# Patient Record
Sex: FEMALE | Race: WHITE | NOT HISPANIC OR LATINO | Employment: FULL TIME | URBAN - METROPOLITAN AREA
[De-identification: names, ages, dates, MRNs, and addresses within clinical notes are randomized per-mention and may not be internally consistent; named-entity substitution may affect disease eponyms.]

---

## 2021-05-03 ENCOUNTER — ANNUAL EXAM (OUTPATIENT)
Dept: OBGYN CLINIC | Facility: CLINIC | Age: 41
End: 2021-05-03
Payer: COMMERCIAL

## 2021-05-03 VITALS
HEIGHT: 69 IN | WEIGHT: 224.6 LBS | DIASTOLIC BLOOD PRESSURE: 62 MMHG | SYSTOLIC BLOOD PRESSURE: 110 MMHG | BODY MASS INDEX: 33.27 KG/M2

## 2021-05-03 DIAGNOSIS — Z01.419 ENCOUNTER FOR ANNUAL ROUTINE GYNECOLOGICAL EXAMINATION: Primary | ICD-10-CM

## 2021-05-03 DIAGNOSIS — B00.9 HSV INFECTION: ICD-10-CM

## 2021-05-03 DIAGNOSIS — N92.0 MENORRHAGIA WITH REGULAR CYCLE: ICD-10-CM

## 2021-05-03 DIAGNOSIS — Z12.31 BREAST CANCER SCREENING BY MAMMOGRAM: ICD-10-CM

## 2021-05-03 PROCEDURE — S0612 ANNUAL GYNECOLOGICAL EXAMINA: HCPCS | Performed by: OBSTETRICS & GYNECOLOGY

## 2021-05-03 RX ORDER — VALACYCLOVIR HYDROCHLORIDE 500 MG/1
500 TABLET, FILM COATED ORAL 2 TIMES DAILY
Qty: 6 TABLET | Refills: 5 | Status: SHIPPED | OUTPATIENT
Start: 2021-05-03 | End: 2021-05-21

## 2021-05-03 RX ORDER — LEVOTHYROXINE SODIUM 0.05 MG/1
1 TABLET ORAL DAILY
COMMUNITY
End: 2021-05-03

## 2021-05-03 RX ORDER — CETIRIZINE HYDROCHLORIDE 10 MG/1
1 TABLET ORAL EVERY 24 HOURS
COMMUNITY

## 2021-05-03 RX ORDER — TRANEXAMIC ACID 650 1/1
1300 TABLET ORAL 3 TIMES DAILY
Qty: 30 TABLET | Refills: 0 | Status: SHIPPED | OUTPATIENT
Start: 2021-05-03 | End: 2021-05-08

## 2021-05-03 RX ORDER — MOMETASONE FUROATE 100 UG/1
1 AEROSOL RESPIRATORY (INHALATION) EVERY 12 HOURS
COMMUNITY

## 2021-05-03 NOTE — LETTER
May 4, 2021     Janette Pap, [address]    Patient: Marleni Starks   YOB: 1980   Date of Visit: 5/3/2021       Dear Dr Colletta Hew: Thank you for referring Marleni Starks to me for evaluation  Below are my notes for this consultation  If you have questions, please do not hesitate to call me  I look forward to following your patient along with you  Sincerely,        Oscar Pate MD        CC: No Recipients  Oscar Pate MD  5/4/2021  1:50 PM  Incomplete  Annual Wellness Visit  [address], [address], [facility], [address]    ASSESSMENT/PLAN: Marleni Starks is a 35 y o  [id number] who presents for annual gynecologic exam     Encounter for routine gynecologic examination  - Routine well woman exam completed today  - Cervical Cancer Screening: Current ASCCP Guidelines reviewed  Last Pap: 08/20/2018 neg w/ neg HPV  Next Pap Due: next year  - STI screening offered including HIV testing: offered, pt declined  - Contraceptive counseling discussed  Current contraception: bilateral salpingectomy  - Breast Cancer Screening: Last Mammogram: she has not had one as was trying to get pregnant when she turned 34 and has been breast feeding since  - The following were reviewed in today's visit: mammography screening ordered, exercise, healthy diet  Additional problems addressed during this visit:  1  Encounter for annual routine gynecological examination    2  Breast cancer screening by mammogram  Comments:  pt just completed breast feeding  Recom waiting 6 months until mammogram   Orders:  -     Mammo screening bilateral w 3d & cad; Future; Expected date: 05/03/2022    3  HSV infection  Assessment & Plan:  Initial outbreak 2015  Pt reports a couple outbreaks a year which she treats with topical lidocaine  She does not have script for Valtrex  Reviewed HSV infection course with patient    Discussed considering episodic treatment vs daily suppression  She is interested in episodic tx - script provided w/ refills  Encouraged to start at first signs of outbreak  If pt is unsure if HSV outbreak, can always make appt for exam at time  Orders:  -     valACYclovir (VALTREX) 500 mg tablet; Take 1 tablet (500 mg total) by mouth 2 (two) times a day for 18 days   Start within 24 hours of symptoms of onset  4  Menorrhagia with regular cycle  Assessment & Plan:  Reviewed w/ pt that OCPs often lighten periods and now that she is using non-hormonal BCM, she is having periods normal for her  Given they are regular and last less than a week - I don't believe this is pathologic but physiologic  Discussed options to control heavy or irregular menses include OCPs, progesterone IUD, cyclic progesterone or non-hormonal Lysteda  Discussed in detail with patient including pros and cons of each  She wishes to try Lysteda  Script to pharmacy  F/u 3 months  If no improvement will consider pelvic u/s to review for other causes  Orders:  -     Tranexamic Acid 650 MG TABS; Take 2 tablets (1,300 mg total) by mouth 3 (three) times a day for 5 days During heavy menses  Next visit: 3 months - f/u medication; 1 year wellness      CC:  Annual Gynecologic Examination    HPI: Jessenia Landin is a 35 y o  [id number] who presents for annual gynecologic examination  Patient presents for annual visit approximately 15 months after delivery of her last child  She just recently finished breast feeding  She denies breast or urinary issues  She states period returned a couple months ago and while regular are extremely heavy  She was previously on OCPs prior to last delivery  Gyn History  Patient's last menstrual period was 04/19/2021 (approximate)  Menstrual History  Menarche Age: 13 years  Period Pattern: Regular  Menstrual Flow: Heavy  Last Pap: 08/20/2018 was normal    She  reports being sexually active and has had partner(s) who are Male   She reports using the following method of birth control/protection: Female Sterilization  Sexual History  Sexually Transmitted Infection History: HSV  Is Patient in a Monogamous Relationship?: Yes    OB History      Past Medical History:  No date: Celiac disease      Comment:  gluten intolerant  : HSV infection      Comment:  maybe 2 outbreaks a year  No date: Obesity  2018: Papanicolaou smear  No date: Seasonal allergies     Past Surgical History:  2000: CERVICAL BIOPSY  W/ LOOP ELECTRODE EXCISION  2016:  SECTION  2019:  SECTION  2019: SALPINGECTOMY; Bilateral      Comment:  w/     Family History   Problem Relation Age of Onset    Lung cancer Father     Lung cancer Paternal Grandfather     Lung cancer Paternal Aunt     No Known Problems Mother         adopted, unknown family history    Breast cancer Neg Hx     Colon cancer Neg Hx     Ovarian cancer Neg Hx         Social History     Tobacco Use    Smoking status: Never Smoker    Smokeless tobacco: Never Used   Substance Use Topics    Alcohol use: Yes     Frequency: 2-4 times a month     Drinks per session: 1 or 2     Binge frequency: Never     Comment: Socially on weekends (less than 4/week)     Drug use: Never          Current Outpatient Medications:     albuterol (PROVENTIL HFA,VENTOLIN HFA) 90 mcg/act inhaler, Inhale Every 6 hours, Disp: , Rfl:     cetirizine (ZyrTEC Allergy) 10 mg tablet, Take 1 tablet by mouth every 24 hours, Disp: , Rfl:     Mometasone Furoate (Asmanex HFA) 100 MCG/ACT AERO, Inhale 1 puff Every 12 hours, Disp: , Rfl:     Tranexamic Acid 650 MG TABS, Take 2 tablets (1,300 mg total) by mouth 3 (three) times a day for 5 days During heavy menses  , Disp: 30 tablet, Rfl: 0    valACYclovir (VALTREX) 500 mg tablet, Take 1 tablet (500 mg total) by mouth 2 (two) times a day for 18 days    Start within 24 hours of symptoms of onset , Disp: 6 tablet, Rfl: 5    She is allergic to gluten meal - food allergy and penicillin v  ROS negative except as noted in HPI    Objective:  /62 (BP Location: Left arm, Patient Position: Sitting, Cuff Size: Large)   Ht 5' 8 5" (1 74 m)   Wt 102 kg (224 lb 9 6 oz)   LMP 2021 (Approximate)   Breastfeeding No   BMI 33 65 kg/m²      Physical Exam  Constitutional:       Appearance: Normal appearance  Chest:      Breasts:         Right: No mass or tenderness  Left: No mass or tenderness  Abdominal:      Palpations: Abdomen is soft  Tenderness: There is no abdominal tenderness  Genitourinary:     General: Normal vulva  Vagina: No bleeding or lesions  Cervix: Normal       Uterus: Normal  Not tender  Adnexa:         Right: No mass or tenderness  Left: No mass or tenderness  Rectum: No external hemorrhoid  Musculoskeletal: Normal range of motion  Lymphadenopathy:      Upper Body:      Right upper body: No axillary adenopathy  Left upper body: No axillary adenopathy  Neurological:      Mental Status: She is alert and oriented to person, place, and time  Psychiatric:         Mood and Affect: Mood normal          Behavior: Behavior normal            Damon Calderon MD  2021  9:59 AM  Sign when Signing Visit  Annual Wellness Visit  [address]   {[place] Locations:[address]}    ASSESSMENT/PLAN: Madhuri Addison is a 35 y o  [id number] who presents for annual gynecologic exam     Encounter for routine gynecologic examination  - Routine well woman exam completed today  - Cervical Cancer Screening: Current ASCCP Guidelines reviewed  Last Pap: 2018 ***  Next Pap Due: ***  - STI screening offered including HIV testing: {stitestin}  - Contraceptive counseling discussed    Current contraception: {Contraception:57199::"***"}  - Breast Cancer Screening: Last Mammogram Not on file, ***  - The following were reviewed in today's visit: {Gyn counselin}    Additional problems addressed during this visit: 1  Encounter for annual routine gynecological examination    2  Breast cancer screening by mammogram  -     Mammo screening bilateral w 3d & cad; Future; Expected date: 2022    3  HSV infection  -     valACYclovir (VALTREX) 500 mg tablet; Take 1 tablet (500 mg total) by mouth 2 (two) times a day for 18 days   Start within 24 hours of symptoms of onset  4  Menorrhagia with regular cycle  -     Tranexamic Acid 650 MG TABS; Take 2 tablets (1,300 mg total) by mouth 3 (three) times a day for 5 days During heavy menses  Next visit: ***      CC:  Annual Gynecologic Examination    HPI: Madhuri Addison is a 35 y o  [id number] who presents for annual gynecologic examination  HPI    Gyn History  Patient's last menstrual period was 2021 (approximate)  Menstrual History  Menarche Age: 13 years  Period Pattern: Regular  Menstrual Flow: Heavy  Last Pap: 2018 {findings; last pap:59929}    She  reports being sexually active and has had partner(s) who are Male  She reports using the following method of birth control/protection: Female Sterilization    Sexual History  Sexually Transmitted Infection History: HSV  Is Patient in a Monogamous Relationship?: Yes    OB History      Past Medical History:  No date: Celiac disease      Comment:  gluten intolerant  : HSV infection      Comment:  maybe 2 outbreaks a year  No date: Obesity  2018: Papanicolaou smear  No date: Seasonal allergies     Past Surgical History:  : CERVICAL BIOPSY  W/ LOOP ELECTRODE EXCISION  2016:  SECTION  2019:  SECTION  2019: SALPINGECTOMY; Bilateral      Comment:  w/     Family History   Problem Relation Age of Onset    Lung cancer Father     Lung cancer Paternal Grandfather     Lung cancer Paternal Aunt     No Known Problems Mother         adopted, unknown family history    Breast cancer Neg Hx     Colon cancer Neg Hx     Ovarian cancer Neg Hx         Social History     Tobacco Use    Smoking status: Never Smoker    Smokeless tobacco: Never Used   Substance Use Topics    Alcohol use: Yes     Frequency: 2-4 times a month     Drinks per session: 1 or 2     Binge frequency: Never     Comment: Socially on weekends (less than 4/week)     Drug use: Never          Current Outpatient Medications:     albuterol (PROVENTIL HFA,VENTOLIN HFA) 90 mcg/act inhaler, Inhale Every 6 hours, Disp: , Rfl:     cetirizine (ZyrTEC Allergy) 10 mg tablet, Take 1 tablet by mouth every 24 hours, Disp: , Rfl:     Mometasone Furoate (Asmanex HFA) 100 MCG/ACT AERO, Inhale 1 puff Every 12 hours, Disp: , Rfl:     Tranexamic Acid 650 MG TABS, Take 2 tablets (1,300 mg total) by mouth 3 (three) times a day for 5 days During heavy menses  , Disp: 30 tablet, Rfl: 0    valACYclovir (VALTREX) 500 mg tablet, Take 1 tablet (500 mg total) by mouth 2 (two) times a day for 18 days   Start within 24 hours of symptoms of onset , Disp: 6 tablet, Rfl: 5    She is allergic to gluten meal - food allergy and penicillin v      ROS negative except as noted in HPI    Objective:  /62 (BP Location: Left arm, Patient Position: Sitting, Cuff Size: Large)   Ht 5' 8 5" (1 74 m)   Wt 102 kg (224 lb 9 6 oz)   LMP 04/19/2021 (Approximate)   Breastfeeding No   BMI 33 65 kg/m²      Physical Exam

## 2021-05-03 NOTE — LETTER
May 4, 2021     [place], [address]    Patient: Doris Fiore   YOB: 1980   Date of Visit: 5/3/2021       Dear Dr Merton Gitelman: Thank you for referring Doris Fiore to me for evaluation  Below are my notes for this consultation  If you have questions, please do not hesitate to call me  I look forward to following your patient along with you  Sincerely,        Dwight Olivia MD        CC: No Recipients  Dwight Olivia MD  5/4/2021  1:50 PM  Incomplete  Annual Wellness Visit  [address], [address], [facility], [address]    ASSESSMENT/PLAN: Doris Fiore is a 35 y o  [id number] who presents for annual gynecologic exam     Encounter for routine gynecologic examination  - Routine well woman exam completed today  - Cervical Cancer Screening: Current ASCCP Guidelines reviewed  Last Pap: 08/20/2018 neg w/ neg HPV  Next Pap Due: next year  - STI screening offered including HIV testing: offered, pt declined  - Contraceptive counseling discussed  Current contraception: bilateral salpingectomy  - Breast Cancer Screening: Last Mammogram: she has not had one as was trying to get pregnant when she turned 34 and has been breast feeding since  - The following were reviewed in today's visit: mammography screening ordered, exercise, healthy diet  Additional problems addressed during this visit:  1  Encounter for annual routine gynecological examination    2  Breast cancer screening by mammogram  Comments:  pt just completed breast feeding  Recom waiting 6 months until mammogram   Orders:  -     Mammo screening bilateral w 3d & cad; Future; Expected date: 05/03/2022    3  HSV infection  Assessment & Plan:  Initial outbreak 2015  Pt reports a couple outbreaks a year which she treats with topical lidocaine  She does not have script for Valtrex  Reviewed HSV infection course with patient    Discussed considering episodic treatment vs daily suppression  She is interested in episodic tx - script provided w/ refills  Encouraged to start at first signs of outbreak  If pt is unsure if HSV outbreak, can always make appt for exam at time  Orders:  -     valACYclovir (VALTREX) 500 mg tablet; Take 1 tablet (500 mg total) by mouth 2 (two) times a day for 18 days   Start within 24 hours of symptoms of onset  4  Menorrhagia with regular cycle  Assessment & Plan:  Reviewed w/ pt that OCPs often lighten periods and now that she is using non-hormonal BCM, she is having periods normal for her  Given they are regular and last less than a week - I don't believe this is pathologic but physiologic  Discussed options to control heavy or irregular menses include OCPs, progesterone IUD, cyclic progesterone or non-hormonal Lysteda  Discussed in detail with patient including pros and cons of each  She wishes to try Lysteda  Script to pharmacy  F/u 3 months  If no improvement will consider pelvic u/s to review for other causes  Orders:  -     Tranexamic Acid 650 MG TABS; Take 2 tablets (1,300 mg total) by mouth 3 (three) times a day for 5 days During heavy menses  Next visit: 3 months - f/u medication; 1 year wellness      CC:  Annual Gynecologic Examination    HPI: Ana Bautista is a 35 y o  [id number] who presents for annual gynecologic examination  Patient presents for annual visit approximately 15 months after delivery of her last child  She just recently finished breast feeding  She denies breast or urinary issues  She states period returned a couple months ago and while regular are extremely heavy  She was previously on OCPs prior to last delivery  Gyn History  Patient's last menstrual period was 04/19/2021 (approximate)  Menstrual History  Menarche Age: 13 years  Period Pattern: Regular  Menstrual Flow: Heavy  Last Pap: 08/20/2018 was normal    She  reports being sexually active and has had partner(s) who are Male   She reports using the following method of birth control/protection: Female Sterilization  Sexual History  Sexually Transmitted Infection History: HSV  Is Patient in a Monogamous Relationship?: Yes    OB History      Past Medical History:  No date: Celiac disease      Comment:  gluten intolerant  : HSV infection      Comment:  maybe 2 outbreaks a year  No date: Obesity  2018: Papanicolaou smear  No date: Seasonal allergies     Past Surgical History:  2000: CERVICAL BIOPSY  W/ LOOP ELECTRODE EXCISION  2016:  SECTION  2019:  SECTION  2019: SALPINGECTOMY; Bilateral      Comment:  w/     Family History   Problem Relation Age of Onset    Lung cancer Father     Lung cancer Paternal Grandfather     Lung cancer Paternal Aunt     No Known Problems Mother         adopted, unknown family history    Breast cancer Neg Hx     Colon cancer Neg Hx     Ovarian cancer Neg Hx         Social History     Tobacco Use    Smoking status: Never Smoker    Smokeless tobacco: Never Used   Substance Use Topics    Alcohol use: Yes     Frequency: 2-4 times a month     Drinks per session: 1 or 2     Binge frequency: Never     Comment: Socially on weekends (less than 4/week)     Drug use: Never          Current Outpatient Medications:     albuterol (PROVENTIL HFA,VENTOLIN HFA) 90 mcg/act inhaler, Inhale Every 6 hours, Disp: , Rfl:     cetirizine (ZyrTEC Allergy) 10 mg tablet, Take 1 tablet by mouth every 24 hours, Disp: , Rfl:     Mometasone Furoate (Asmanex HFA) 100 MCG/ACT AERO, Inhale 1 puff Every 12 hours, Disp: , Rfl:     Tranexamic Acid 650 MG TABS, Take 2 tablets (1,300 mg total) by mouth 3 (three) times a day for 5 days During heavy menses  , Disp: 30 tablet, Rfl: 0    valACYclovir (VALTREX) 500 mg tablet, Take 1 tablet (500 mg total) by mouth 2 (two) times a day for 18 days    Start within 24 hours of symptoms of onset , Disp: 6 tablet, Rfl: 5    She is allergic to gluten meal - food allergy and penicillin v  ROS negative except as noted in HPI    Objective:  /62 (BP Location: Left arm, Patient Position: Sitting, Cuff Size: Large)   Ht 5' 8 5" (1 74 m)   Wt 102 kg (224 lb 9 6 oz)   LMP 2021 (Approximate)   Breastfeeding No   BMI 33 65 kg/m²      Physical Exam  Constitutional:       Appearance: Normal appearance  Chest:      Breasts:         Right: No mass or tenderness  Left: No mass or tenderness  Abdominal:      Palpations: Abdomen is soft  Tenderness: There is no abdominal tenderness  Genitourinary:     General: Normal vulva  Vagina: No bleeding or lesions  Cervix: Normal       Uterus: Normal  Not tender  Adnexa:         Right: No mass or tenderness  Left: No mass or tenderness  Rectum: No external hemorrhoid  Musculoskeletal: Normal range of motion  Lymphadenopathy:      Upper Body:      Right upper body: No axillary adenopathy  Left upper body: No axillary adenopathy  Neurological:      Mental Status: She is alert and oriented to person, place, and time  Psychiatric:         Mood and Affect: Mood normal          Behavior: Behavior normal            Elizabeth Lilly MD  2021  9:59 AM  Sign when Signing Visit  Annual Wellness Visit  [address]   {[place] Locations:[address]}    ASSESSMENT/PLAN: Lidia Sandoval is a 35 y o  [id number] who presents for annual gynecologic exam     Encounter for routine gynecologic examination  - Routine well woman exam completed today  - Cervical Cancer Screening: Current ASCCP Guidelines reviewed  Last Pap: 2018 ***  Next Pap Due: ***  - STI screening offered including HIV testing: {stitestin}  - Contraceptive counseling discussed    Current contraception: {Contraception:17480::"***"}  - Breast Cancer Screening: Last Mammogram Not on file, ***  - The following were reviewed in today's visit: {Gyn counselin}    Additional problems addressed during this visit: 1  Encounter for annual routine gynecological examination    2  Breast cancer screening by mammogram  -     Mammo screening bilateral w 3d & cad; Future; Expected date: 2022    3  HSV infection  -     valACYclovir (VALTREX) 500 mg tablet; Take 1 tablet (500 mg total) by mouth 2 (two) times a day for 18 days   Start within 24 hours of symptoms of onset  4  Menorrhagia with regular cycle  -     Tranexamic Acid 650 MG TABS; Take 2 tablets (1,300 mg total) by mouth 3 (three) times a day for 5 days During heavy menses  Next visit: ***      CC:  Annual Gynecologic Examination    HPI: Justin Nickerson is a 35 y o  [id number] who presents for annual gynecologic examination  HPI    Gyn History  Patient's last menstrual period was 2021 (approximate)  Menstrual History  Menarche Age: 13 years  Period Pattern: Regular  Menstrual Flow: Heavy  Last Pap: 2018 {findings; last pap:56828}    She  reports being sexually active and has had partner(s) who are Male  She reports using the following method of birth control/protection: Female Sterilization    Sexual History  Sexually Transmitted Infection History: HSV  Is Patient in a Monogamous Relationship?: Yes    OB History      Past Medical History:  No date: Celiac disease      Comment:  gluten intolerant  2015: HSV infection      Comment:  maybe 2 outbreaks a year  No date: Obesity  2018: Papanicolaou smear  No date: Seasonal allergies     Past Surgical History:  : CERVICAL BIOPSY  W/ LOOP ELECTRODE EXCISION  2016:  SECTION  2019:  SECTION  2019: SALPINGECTOMY; Bilateral      Comment:  w/     Family History   Problem Relation Age of Onset    Lung cancer Father     Lung cancer Paternal Grandfather     Lung cancer Paternal Aunt     No Known Problems Mother         adopted, unknown family history    Breast cancer Neg Hx     Colon cancer Neg Hx     Ovarian cancer Neg Hx         Social History     Tobacco Use    Smoking status: Never Smoker    Smokeless tobacco: Never Used   Substance Use Topics    Alcohol use: Yes     Frequency: 2-4 times a month     Drinks per session: 1 or 2     Binge frequency: Never     Comment: Socially on weekends (less than 4/week)     Drug use: Never          Current Outpatient Medications:     albuterol (PROVENTIL HFA,VENTOLIN HFA) 90 mcg/act inhaler, Inhale Every 6 hours, Disp: , Rfl:     cetirizine (ZyrTEC Allergy) 10 mg tablet, Take 1 tablet by mouth every 24 hours, Disp: , Rfl:     Mometasone Furoate (Asmanex HFA) 100 MCG/ACT AERO, Inhale 1 puff Every 12 hours, Disp: , Rfl:     Tranexamic Acid 650 MG TABS, Take 2 tablets (1,300 mg total) by mouth 3 (three) times a day for 5 days During heavy menses  , Disp: 30 tablet, Rfl: 0    valACYclovir (VALTREX) 500 mg tablet, Take 1 tablet (500 mg total) by mouth 2 (two) times a day for 18 days   Start within 24 hours of symptoms of onset , Disp: 6 tablet, Rfl: 5    She is allergic to gluten meal - food allergy and penicillin v      ROS negative except as noted in HPI    Objective:  /62 (BP Location: Left arm, Patient Position: Sitting, Cuff Size: Large)   Ht 5' 8 5" (1 74 m)   Wt 102 kg (224 lb 9 6 oz)   LMP 04/19/2021 (Approximate)   Breastfeeding No   BMI 33 65 kg/m²      Physical Exam

## 2021-05-03 NOTE — PATIENT INSTRUCTIONS
- Maintain healthy weight with BMI ideally between 18-25     - Eat a healthy diet, including multiple servings of vegetables and fruits, as well as lean protein sources  - Get at least 150 minutes of moderate aerobic activity or 75 minutes of vigorous aerobic activity a week, or a combination of moderate and vigorous activity  Greater amounts of exercise will provide an even greater health benefit  - Ensure diet provides 1200mg of Calcium daily (divided) and 800IU of vitamin D, or take supplements to meet this  - Safe sex practices recommended

## 2021-05-03 NOTE — LETTER
May 4, 2021     Nae Cazares, [address]    Patient: Henri Cornejo   YOB: 1980   Date of Visit: 5/3/2021       Dear Dr Curiel Re: Thank you for referring Henri Cornejo to me for evaluation  Below are my notes for this consultation  If you have questions, please do not hesitate to call me  I look forward to following your patient along with you  Sincerely,        Verena Nuñez MD        CC: No Recipients  Verena Nuñez MD  5/4/2021  1:51 PM  Sign when Signing Visit  Annual Wellness Visit  [address], [address], [facility], [address]    ASSESSMENT/PLAN: Henri Cornejo is a 35 y o  [id number] who presents for annual gynecologic exam     Encounter for routine gynecologic examination  - Routine well woman exam completed today  - Cervical Cancer Screening: Current ASCCP Guidelines reviewed  Last Pap: 08/20/2018 neg w/ neg HPV  Next Pap Due: next year  - STI screening offered including HIV testing: offered, pt declined  - Contraceptive counseling discussed  Current contraception: bilateral salpingectomy  - Breast Cancer Screening: Last Mammogram: she has not had one as was trying to get pregnant when she turned 34 and has been breast feeding since  - The following were reviewed in today's visit: mammography screening ordered, exercise, healthy diet  Additional problems addressed during this visit:  1  Encounter for annual routine gynecological examination    2  Breast cancer screening by mammogram  Comments:  pt just completed breast feeding  Recom waiting 6 months until mammogram   Orders:  -     Mammo screening bilateral w 3d & cad; Future; Expected date: 05/03/2022    3  HSV infection  Assessment & Plan:  Initial outbreak 2015  Pt reports a couple outbreaks a year which she treats with topical lidocaine  She does not have script for Valtrex  Reviewed HSV infection course with patient    Discussed considering episodic treatment vs daily suppression  She is interested in episodic tx - script provided w/ refills  Encouraged to start at first signs of outbreak  If pt is unsure if HSV outbreak, can always make appt for exam at time  Orders:  -     valACYclovir (VALTREX) 500 mg tablet; Take 1 tablet (500 mg total) by mouth 2 (two) times a day for 18 days   Start within 24 hours of symptoms of onset  4  Menorrhagia with regular cycle  Assessment & Plan:  Reviewed w/ pt that OCPs often lighten periods and now that she is using non-hormonal BCM, she is having periods normal for her  Given they are regular and last less than a week - I don't believe this is pathologic but physiologic  Discussed options to control heavy or irregular menses include OCPs, progesterone IUD, cyclic progesterone or non-hormonal Lysteda  Discussed in detail with patient including pros and cons of each  She wishes to try Lysteda  Script to pharmacy  F/u 3 months  If no improvement will consider pelvic u/s to review for other causes  Orders:  -     Tranexamic Acid 650 MG TABS; Take 2 tablets (1,300 mg total) by mouth 3 (three) times a day for 5 days During heavy menses  Next visit: 3 months - f/u medication; 1 year wellness      CC:  Annual Gynecologic Examination    HPI: Max Schaumann is a 35 y o  [id number] who presents for annual gynecologic examination  Patient presents for annual visit approximately 15 months after delivery of her last child  She just recently finished breast feeding  She denies breast or urinary issues  She states period returned a couple months ago and while regular are extremely heavy  She was previously on OCPs prior to last delivery  Gyn History  Patient's last menstrual period was 04/19/2021 (approximate)  Menstrual History  Menarche Age: 13 years  Period Pattern: Regular  Menstrual Flow: Heavy  Last Pap: 08/20/2018 was normal    She  reports being sexually active and has had partner(s) who are Male   She reports using the following method of birth control/protection: Female Sterilization  Sexual History  Sexually Transmitted Infection History: HSV  Is Patient in a Monogamous Relationship?: Yes    OB History      Past Medical History:  No date: Celiac disease      Comment:  gluten intolerant  : HSV infection      Comment:  maybe 2 outbreaks a year  No date: Obesity  2018: Papanicolaou smear  No date: Seasonal allergies     Past Surgical History:  : CERVICAL BIOPSY  W/ LOOP ELECTRODE EXCISION  2016:  SECTION  2019:  SECTION  2019: SALPINGECTOMY; Bilateral      Comment:  w/     Family History   Problem Relation Age of Onset    Lung cancer Father     Lung cancer Paternal Grandfather     Lung cancer Paternal Aunt     No Known Problems Mother         adopted, unknown family history    Breast cancer Neg Hx     Colon cancer Neg Hx     Ovarian cancer Neg Hx         Social History     Tobacco Use    Smoking status: Never Smoker    Smokeless tobacco: Never Used   Substance Use Topics    Alcohol use: Yes     Frequency: 2-4 times a month     Drinks per session: 1 or 2     Binge frequency: Never     Comment: Socially on weekends (less than 4/week)     Drug use: Never          Current Outpatient Medications:     albuterol (PROVENTIL HFA,VENTOLIN HFA) 90 mcg/act inhaler, Inhale Every 6 hours, Disp: , Rfl:     cetirizine (ZyrTEC Allergy) 10 mg tablet, Take 1 tablet by mouth every 24 hours, Disp: , Rfl:     Mometasone Furoate (Asmanex HFA) 100 MCG/ACT AERO, Inhale 1 puff Every 12 hours, Disp: , Rfl:     Tranexamic Acid 650 MG TABS, Take 2 tablets (1,300 mg total) by mouth 3 (three) times a day for 5 days During heavy menses  , Disp: 30 tablet, Rfl: 0    valACYclovir (VALTREX) 500 mg tablet, Take 1 tablet (500 mg total) by mouth 2 (two) times a day for 18 days    Start within 24 hours of symptoms of onset , Disp: 6 tablet, Rfl: 5    She is allergic to gluten meal - food allergy and penicillin v  ROS negative except as noted in HPI    Objective:  /62 (BP Location: Left arm, Patient Position: Sitting, Cuff Size: Large)   Ht 5' 8 5" (1 74 m)   Wt 102 kg (224 lb 9 6 oz)   LMP 04/19/2021 (Approximate)   Breastfeeding No   BMI 33 65 kg/m²      Physical Exam  Constitutional:       Appearance: Normal appearance  Chest:      Breasts:         Right: No mass or tenderness  Left: No mass or tenderness  Abdominal:      Palpations: Abdomen is soft  Tenderness: There is no abdominal tenderness  Genitourinary:     General: Normal vulva  Vagina: No bleeding or lesions  Cervix: Normal       Uterus: Normal  Not tender  Adnexa:         Right: No mass or tenderness  Left: No mass or tenderness  Rectum: No external hemorrhoid  Musculoskeletal: Normal range of motion  Lymphadenopathy:      Upper Body:      Right upper body: No axillary adenopathy  Left upper body: No axillary adenopathy  Neurological:      Mental Status: She is alert and oriented to person, place, and time     Psychiatric:         Mood and Affect: Mood normal          Behavior: Behavior normal

## 2021-05-04 NOTE — ASSESSMENT & PLAN NOTE
Reviewed w/ pt that OCPs often lighten periods and now that she is using non-hormonal BCM, she is having periods normal for her  Given they are regular and last less than a week - I don't believe this is pathologic but physiologic  Discussed options to control heavy or irregular menses include OCPs, progesterone IUD, cyclic progesterone or non-hormonal Lysteda  Discussed in detail with patient including pros and cons of each  She wishes to try Lysteda  Script to pharmacy  F/u 3 months  If no improvement will consider pelvic u/s to review for other causes

## 2021-05-04 NOTE — ASSESSMENT & PLAN NOTE
Initial outbreak 2015  Pt reports a couple outbreaks a year which she treats with topical lidocaine  She does not have script for Valtrex  Reviewed HSV infection course with patient  Discussed considering episodic treatment vs daily suppression  She is interested in episodic tx - script provided w/ refills  Encouraged to start at first signs of outbreak  If pt is unsure if HSV outbreak, can always make appt for exam at time

## 2021-05-04 NOTE — PROGRESS NOTES
Annual Wellness Visit  [address], [address], [facility], [address] [facility]    ASSESSMENT/PLAN: Srinivasa Joseph is a 35 y o  [id number] who presents for annual gynecologic exam     Encounter for routine gynecologic examination  - Routine well woman exam completed today  - Cervical Cancer Screening: Current ASCCP Guidelines reviewed  Last Pap: 08/20/2018 neg w/ neg HPV  Next Pap Due: next year  - STI screening offered including HIV testing: offered, pt declined  - Contraceptive counseling discussed  Current contraception: bilateral salpingectomy  - Breast Cancer Screening: Last Mammogram: she has not had one as was trying to get pregnant when she turned 34 and has been breast feeding since  - The following were reviewed in today's visit: mammography screening ordered, exercise, healthy diet  Additional problems addressed during this visit:  1  Encounter for annual routine gynecological examination    2  Breast cancer screening by mammogram  Comments:  pt just completed breast feeding  Recom waiting 6 months until mammogram   Orders:  -     Mammo screening bilateral w 3d & cad; Future; Expected date: 05/03/2022    3  HSV infection  Assessment & Plan:  Initial outbreak 2015  Pt reports a couple outbreaks a year which she treats with topical lidocaine  She does not have script for Valtrex  Reviewed HSV infection course with patient  Discussed considering episodic treatment vs daily suppression  She is interested in episodic tx - script provided w/ refills  Encouraged to start at first signs of outbreak  If pt is unsure if HSV outbreak, can always make appt for exam at time  Orders:  -     valACYclovir (VALTREX) 500 mg tablet; Take 1 tablet (500 mg total) by mouth 2 (two) times a day for 18 days   Start within 24 hours of symptoms of onset      4  Menorrhagia with regular cycle  Assessment & Plan:  Reviewed w/ pt that OCPs often lighten periods and now that she is using non-hormonal BCM, she is having periods normal for her  Given they are regular and last less than a week - I don't believe this is pathologic but physiologic  Discussed options to control heavy or irregular menses include OCPs, progesterone IUD, cyclic progesterone or non-hormonal Lysteda  Discussed in detail with patient including pros and cons of each  She wishes to try Lysteda  Script to pharmacy  F/u 3 months  If no improvement will consider pelvic u/s to review for other causes  Orders:  -     Tranexamic Acid 650 MG TABS; Take 2 tablets (1,300 mg total) by mouth 3 (three) times a day for 5 days During heavy menses  Next visit: 3 months - f/u medication; 1 year wellness      CC:  Annual Gynecologic Examination    HPI: Marco Pelaez is a 35 y o  [id number] who presents for annual gynecologic examination  Patient presents for annual visit approximately 15 months after delivery of her last child  She just recently finished breast feeding  She denies breast or urinary issues  She states period returned a couple months ago and while regular are extremely heavy  She was previously on OCPs prior to last delivery  Gyn History  Patient's last menstrual period was 2021 (approximate)  Menstrual History  Menarche Age: 13 years  Period Pattern: Regular  Menstrual Flow: Heavy  Last Pap: 2018 was normal    She  reports being sexually active and has had partner(s) who are Male  She reports using the following method of birth control/protection: Female Sterilization    Sexual History  Sexually Transmitted Infection History: HSV  Is Patient in a Monogamous Relationship?: Yes    OB History      Past Medical History:  No date: Celiac disease      Comment:  gluten intolerant  2015: HSV infection      Comment:  maybe 2 outbreaks a year  No date: Obesity  2018: Papanicolaou smear  No date: Seasonal allergies     Past Surgical History:  : CERVICAL BIOPSY  W/ LOOP ELECTRODE EXCISION  2016:  SECTION  2019:  SECTION  2019: SALPINGECTOMY; Bilateral      Comment:  w/     Family History   Problem Relation Age of Onset    Lung cancer Father     Lung cancer Paternal Grandfather     Lung cancer Paternal Aunt     No Known Problems Mother         adopted, unknown family history    Breast cancer Neg Hx     Colon cancer Neg Hx     Ovarian cancer Neg Hx         Social History     Tobacco Use    Smoking status: Never Smoker    Smokeless tobacco: Never Used   Substance Use Topics    Alcohol use: Yes     Frequency: 2-4 times a month     Drinks per session: 1 or 2     Binge frequency: Never     Comment: Socially on weekends (less than 4/week)     Drug use: Never          Current Outpatient Medications:     albuterol (PROVENTIL HFA,VENTOLIN HFA) 90 mcg/act inhaler, Inhale Every 6 hours, Disp: , Rfl:     cetirizine (ZyrTEC Allergy) 10 mg tablet, Take 1 tablet by mouth every 24 hours, Disp: , Rfl:     Mometasone Furoate (Asmanex HFA) 100 MCG/ACT AERO, Inhale 1 puff Every 12 hours, Disp: , Rfl:     Tranexamic Acid 650 MG TABS, Take 2 tablets (1,300 mg total) by mouth 3 (three) times a day for 5 days During heavy menses  , Disp: 30 tablet, Rfl: 0    valACYclovir (VALTREX) 500 mg tablet, Take 1 tablet (500 mg total) by mouth 2 (two) times a day for 18 days   Start within 24 hours of symptoms of onset , Disp: 6 tablet, Rfl: 5    She is allergic to gluten meal - food allergy and penicillin v  ROS negative except as noted in HPI    Objective:  /62 (BP Location: Left arm, Patient Position: Sitting, Cuff Size: Large)   Ht 5' 8 5" (1 74 m)   Wt 102 kg (224 lb 9 6 oz)   LMP 2021 (Approximate)   Breastfeeding No   BMI 33 65 kg/m²      Physical Exam  Constitutional:       Appearance: Normal appearance  Chest:      Breasts:         Right: No mass or tenderness  Left: No mass or tenderness  Abdominal:      Palpations: Abdomen is soft  Tenderness:  There is no abdominal tenderness  Genitourinary:     General: Normal vulva  Vagina: No bleeding or lesions  Cervix: Normal       Uterus: Normal  Not tender  Adnexa:         Right: No mass or tenderness  Left: No mass or tenderness  Rectum: No external hemorrhoid  Musculoskeletal: Normal range of motion  Lymphadenopathy:      Upper Body:      Right upper body: No axillary adenopathy  Left upper body: No axillary adenopathy  Neurological:      Mental Status: She is alert and oriented to person, place, and time     Psychiatric:         Mood and Affect: Mood normal          Behavior: Behavior normal

## 2021-08-19 ENCOUNTER — VBI (OUTPATIENT)
Dept: ADMINISTRATIVE | Facility: OTHER | Age: 41
End: 2021-08-19

## 2021-08-19 NOTE — TELEPHONE ENCOUNTER
Upon review of the In Basket request we were able to locate, review, and update the patient chart as requested for Pap Smear (HPV) aka Cervical Cancer Screening  Any additional questions or concerns should be emailed to the Practice Liaisons via [email or web address] email, please do not reply via In Basket      Thank you  Bandar Fallon

## 2022-09-27 ENCOUNTER — ANNUAL EXAM (OUTPATIENT)
Dept: OBGYN CLINIC | Facility: CLINIC | Age: 42
End: 2022-09-27

## 2022-09-27 VITALS
BODY MASS INDEX: 29.96 KG/M2 | DIASTOLIC BLOOD PRESSURE: 80 MMHG | HEIGHT: 71 IN | WEIGHT: 214 LBS | SYSTOLIC BLOOD PRESSURE: 120 MMHG

## 2022-09-27 DIAGNOSIS — N92.0 MENORRHAGIA WITH REGULAR CYCLE: ICD-10-CM

## 2022-09-27 DIAGNOSIS — Z01.419 ENCOUNTER FOR ANNUAL ROUTINE GYNECOLOGICAL EXAMINATION: Primary | ICD-10-CM

## 2022-09-27 DIAGNOSIS — Z12.4 CERVICAL CANCER SCREENING: ICD-10-CM

## 2022-09-27 DIAGNOSIS — B00.9 HSV INFECTION: ICD-10-CM

## 2022-09-27 DIAGNOSIS — Z12.31 ENCOUNTER FOR SCREENING MAMMOGRAM FOR MALIGNANT NEOPLASM OF BREAST: ICD-10-CM

## 2022-09-27 NOTE — PROGRESS NOTES
[address] [facility], [address] [address] [facility] [address]    ASSESSMENT/PLAN: Max Schaumann is a 38 y o  [id number] who presents for annual gynecologic exam     Encounter for routine gynecologic examination  - Routine well woman exam completed today  - Cervical Cancer Screening: Current ASCCP Guidelines reviewed  Last Pap: 2018   Next Pap Due: today   - COVID vaccine   JJ   Booster   None      - STI screening offered including HIV testing: na  - Contraceptive counseling discussed  Current contraception:  Tubal   - Breast Cancer Screening: Last Mammogram    Additional problems addressed during this visit:  1  Encounter for annual routine gynecological examination    2  Encounter for screening mammogram for malignant neoplasm of breast  -     Mammo screening bilateral w 3d & cad; Future    3  HSV infection  Comments:  no outbreaks     4  Menorrhagia with regular cycle  Comments:  Lysteda worked   betternow! Motrin 600 mg every 6 hours while awake    5  Cervical cancer screening  -     IGP, Aptima HPV, Rfx 16/18,45        CC:  Annual Gynecologic Examination    HPI: Max Schaumann is a 38 y o  [id number] who presents for annual gynecologic examination  39 yo here for wellness exam    + hx of  Tubal ligation  Hx of HMB used  lysteda  w some nausea  Things better now  Cycles monthly   Needs mammogram  Hx of leep       The following portions of the patient's history were reviewed and updated as appropriate: She  has a past medical history of Celiac disease, HSV infection (), Obesity, Papanicolaou smear (2018), and Seasonal allergies  She  has a past surgical history that includes Cervical biopsy w/ loop electrode excision ();  section (2016);  section (); and Salpingectomy (Bilateral, )  Her family history includes Lung cancer in her father, paternal aunt, and paternal grandfather; No Known Problems in her mother    She  reports that she has never smoked  She has never used smokeless tobacco  She reports current alcohol use  She reports that she does not use drugs  Current Outpatient Medications   Medication Sig Dispense Refill    albuterol (PROVENTIL HFA,VENTOLIN HFA) 90 mcg/act inhaler Inhale Every 6 hours      cetirizine (ZyrTEC Allergy) 10 mg tablet Take 1 tablet by mouth every 24 hours      Mometasone Furoate (Asmanex HFA) 100 MCG/ACT AERO Inhale 1 puff Every 12 hours      valACYclovir (VALTREX) 500 mg tablet Take 1 tablet (500 mg total) by mouth 2 (two) times a day for 18 days   Start within 24 hours of symptoms of onset  6 tablet 5     No current facility-administered medications for this visit  She is allergic to gluten meal - food allergy and penicillin v     Review of Systems   Constitutional: Negative for chills and fever  HENT: Negative for ear pain and sore throat  Eyes: Negative for pain and visual disturbance  Respiratory: Negative for cough and shortness of breath  Cardiovascular: Negative for chest pain and palpitations  Gastrointestinal: Negative for abdominal pain and vomiting  Genitourinary: Negative for dysuria and hematuria  Musculoskeletal: Negative for arthralgias and back pain  Skin: Negative for color change and rash  Neurological: Negative for seizures and syncope  Hematological: Negative  Psychiatric/Behavioral: Negative  All other systems reviewed and are negative  Objective:  /80 (BP Location: Left arm, Patient Position: Sitting, Cuff Size: Standard)   Ht 5' 11" (1 803 m)   Wt 97 1 kg (214 lb)   LMP 09/19/2022 (Exact Date)   BMI 29 85 kg/m²    Physical Exam  Vitals and nursing note reviewed  Constitutional:       Appearance: Normal appearance  HENT:      Head: Normocephalic  Cardiovascular:      Rate and Rhythm: Normal rate and regular rhythm  Pulses: Normal pulses  Heart sounds: Normal heart sounds     Pulmonary:      Effort: Pulmonary effort is normal  Breath sounds: Normal breath sounds  Chest:      Chest wall: No mass, lacerations, swelling, tenderness or edema  Breasts: Tima Score is 4  Breasts are symmetrical       Right: Normal  No swelling, bleeding, inverted nipple, mass, nipple discharge, skin change, tenderness, axillary adenopathy or supraclavicular adenopathy  Left: No swelling, bleeding, inverted nipple, mass, nipple discharge, skin change, tenderness, axillary adenopathy or supraclavicular adenopathy  Abdominal:      General: Abdomen is flat  Bowel sounds are normal       Palpations: Abdomen is soft  Genitourinary:     General: Normal vulva  Exam position: Lithotomy position  Pubic Area: No rash  Tima stage (genital): 4       Labia:         Right: No rash, tenderness or lesion  Left: No rash, tenderness or lesion  Urethra: No urethral pain, urethral swelling or urethral lesion  Vagina: Normal       Cervix: No cervical motion tenderness or discharge  Uterus: Normal        Adnexa: Right adnexa normal and left adnexa normal       Rectum: Normal    Musculoskeletal:         General: Normal range of motion  Cervical back: Neck supple  Lymphadenopathy:      Upper Body:      Right upper body: No supraclavicular, axillary or pectoral adenopathy  Left upper body: No supraclavicular, axillary or pectoral adenopathy  Lower Body: No right inguinal adenopathy  No left inguinal adenopathy  Skin:     General: Skin is warm and dry  Neurological:      General: No focal deficit present  Mental Status: She is alert and oriented to person, place, and time  Psychiatric:         Mood and Affect: Mood normal          Behavior: Behavior normal          Thought Content:  Thought content normal          Judgment: Judgment normal

## 2022-10-04 LAB
CYTOLOGIST CVX/VAG CYTO: NORMAL
DX ICD CODE: NORMAL
HPV I/H RISK 4 DNA CVX QL PROBE+SIG AMP: NEGATIVE
OTHER STN SPEC: NORMAL
PATH REPORT.FINAL DX SPEC: NORMAL
SL AMB NOTE:: NORMAL
SL AMB SPECIMEN ADEQUACY: NORMAL
SL AMB TEST METHODOLOGY: NORMAL

## 2022-10-11 DIAGNOSIS — Z12.31 ENCOUNTER FOR SCREENING MAMMOGRAM FOR MALIGNANT NEOPLASM OF BREAST: ICD-10-CM

## 2024-08-08 DIAGNOSIS — Z00.6 ENCOUNTER FOR EXAMINATION FOR NORMAL COMPARISON OR CONTROL IN CLINICAL RESEARCH PROGRAM: ICD-10-CM

## 2024-08-14 ENCOUNTER — OFFICE VISIT (OUTPATIENT)
Dept: OBGYN CLINIC | Facility: CLINIC | Age: 44
End: 2024-08-14
Payer: COMMERCIAL

## 2024-08-14 VITALS
SYSTOLIC BLOOD PRESSURE: 102 MMHG | WEIGHT: 222 LBS | DIASTOLIC BLOOD PRESSURE: 62 MMHG | BODY MASS INDEX: 32.88 KG/M2 | HEIGHT: 69 IN

## 2024-08-14 DIAGNOSIS — Z12.31 ENCOUNTER FOR SCREENING MAMMOGRAM FOR MALIGNANT NEOPLASM OF BREAST: ICD-10-CM

## 2024-08-14 DIAGNOSIS — Z01.419 ENCOUNTER FOR ANNUAL ROUTINE GYNECOLOGICAL EXAMINATION: Primary | ICD-10-CM

## 2024-08-14 DIAGNOSIS — Z12.11 SCREEN FOR COLON CANCER: ICD-10-CM

## 2024-08-14 DIAGNOSIS — B00.9 HSV INFECTION: ICD-10-CM

## 2024-08-14 PROCEDURE — S0612 ANNUAL GYNECOLOGICAL EXAMINA: HCPCS | Performed by: STUDENT IN AN ORGANIZED HEALTH CARE EDUCATION/TRAINING PROGRAM

## 2024-08-14 RX ORDER — VALACYCLOVIR HYDROCHLORIDE 500 MG/1
500 TABLET, FILM COATED ORAL 2 TIMES DAILY
Qty: 6 TABLET | Refills: 12 | Status: SHIPPED | OUTPATIENT
Start: 2024-08-14 | End: 2024-08-17

## 2024-08-14 NOTE — PROGRESS NOTES
[facility] - [address], [address], [place] [address]    ASSESSMENT/PLAN: Cira Camargo is a 44 y.o.  who presents for annual gynecologic exam.    Encounter for routine gynecologic examination  - Routine well woman exam completed today.  - Cervical Cancer Screening: Current ASCCP Guidelines reviewed. Last Pap: 2022. History of abnormal: Remote history of LEEP in , normal since.  - STI screening offered including HIV testing: offered, pt declined  - Contraceptive counseling discussed.  Current contraception: salpingectomy:   - Breast Cancer Screening: Last Mammogram 2022, repeat ordered.   - Colorectal cancer screening was ordered.  - The following were reviewed in today's visit: breast self exam, mammography screening ordered, exercise, and healthy diet    Additional problems addressed during this visit:  1. Encounter for annual routine gynecological examination  2. Encounter for screening mammogram for malignant neoplasm of breast  -     Mammo screening bilateral w 3d & cad; Future  3. HSV infection  -     valACYclovir (VALTREX) 500 mg tablet; Take 1 tablet (500 mg total) by mouth 2 (two) times a day for 3 days . Start within 24 hours of symptoms of onset.  4. Screen for colon cancer  -     Ambulatory Referral to Gastroenterology; Future      CC:  Annual Gynecologic Examination    HPI: Cira Camargo is a 44 y.o.  who presents for annual gynecologic examination. She is without complaint today.     ROS: Negative except as noted in HPI    Patient's last menstrual period was 2024.  Menstrual History  Period Cycle (Days): 28  Period Duration (Days): 4-7  Period Pattern: Regular  Menstrual Flow: Heavy  Menstrual Control: Tampon, Maxi pad  Menstrual Control Change Freq (Hours): 2-3  Dysmenorrhea: (!) Mild  Dysmenorrhea Symptoms: Cramping    She  reports being sexually active and has had partner(s) who are male. She reports using the following method of birth  control/protection: Female Sterilization.  Sexual History  Sexually Transmitted Infection History: HSV  Multiple Sex Partners: No  Is Patient in a Monogamous Relationship?: Yes    The following portions of the patient's history were reviewed and updated as appropriate:   Past Medical History:   Diagnosis Date    Asthma     Celiac disease     gluten intolerant    Female infertility     HSV infection     maybe 2 outbreaks a year    Obesity     Papanicolaou smear 2018    Seasonal allergies      Past Surgical History:   Procedure Laterality Date    CERVICAL BIOPSY  W/ LOOP ELECTRODE EXCISION       SECTION  2016     SECTION  2019    CHOLECYSTECTOMY      HERNIA REPAIR      x 2 , 2024    SALPINGECTOMY Bilateral 2019    w/     Family History   Problem Relation Age of Onset    No Known Problems Mother         adopted, unknown family history    Lung cancer Father     Cancer Father     Lung cancer Paternal Grandfather     Cancer Paternal Grandfather     Lung cancer Paternal Aunt     Breast cancer Neg Hx     Colon cancer Neg Hx     Ovarian cancer Neg Hx     Uterine cancer Neg Hx      Social History     Socioeconomic History    Marital status: /Civil Union     Spouse name: None    Number of children: None    Years of education: None    Highest education level: Master's degree (e.g., MA, MS, Roxanne, MEd, MSW, SANTI[employer]O)   Occupational History    Occupation: Teacher   Tobacco Use    Smoking status: Never    Smokeless tobacco: Never   Vaping Use    Vaping status: Never Used   Substance and Sexual Activity    Alcohol use: Not Currently     Comment: Socially on weekends (less than 4/week)     Drug use: Never    Sexual activity: Yes     Partners: Male     Birth control/protection: Female Sterilization     Comment: no new partners   Other Topics Concern    None   Social History Narrative    Sexual abuse: No    Exercise: Weekly    Domestic violence: No    History of drug/alchol abuse: Denies  "    Social Determinants of Health     Financial Resource Strain: Not on file   Food Insecurity: Not on file   Transportation Needs: Not on file   Physical Activity: Not on file   Stress: Not on file   Social Connections: Not on file   Intimate Partner Violence: Not on file   Housing Stability: Not on file     Outpatient Medications Marked as Taking for the 8/14/24 encounter (Office Visit) with Niko Worthington MD   Medication    albuterol (PROVENTIL HFA,VENTOLIN HFA) 90 mcg/act inhaler    cetirizine (ZyrTEC Allergy) 10 mg tablet    Mometasone Furoate (Asmanex HFA) 100 MCG/ACT AERO    valACYclovir (VALTREX) 500 mg tablet     Allergies   Allergen Reactions    Gluten Meal - Food Allergy GI Intolerance    Penicillin V Hives           Objective:  /62 (BP Location: Right arm, Patient Position: Sitting, Cuff Size: Standard)   Ht 5' 8.5\" (1.74 m)   Wt 101 kg (222 lb)   LMP 07/26/2024   BMI 33.26 kg/m²        Chaperone present? Yes: Winifred Garcia MA.    General Appearance: alert and oriented, in no acute distress.   Neck/Thyroid: No thyromegaly, no thyroid nodules.  Respiratory: Unlabored breathing.  Cardiovascular: Regular rate, no peripheral edema.  Abdomen: Soft, non-tender, non-distended, no masses, no rebound or guarding.  Breast Exam: No dimpling, nipple retraction or discharge. No lumps or masses. No axillary or supraclavicular nodes.   Pelvic:       External genitalia: Normal appearance, no abnormal pigmentation, no lesions or masses. Normal Bartholin's and Cement's.      Urinary system: Urethral meatus normal, bladder non-tender.      Vaginal: normal mucosa without prolapse or lesions. Normal-appearing physiologic discharge.      Cervix: Normal-appearing, well-epithelialized, no gross lesions or masses. No cervical motion tenderness.      Adnexa: No adnexal masses or tenderness noted.      Uterus: Normal-sized, regular contour, midline, mobile, no uterine tenderness.  Extremities: Normal range of motion. " Warm, well-perfused, non-tender.   Skin: normal, no rash or abnormalities  Neurologic: alert, oriented x3  Psychiatric: Appropriate affect, mood stable, cooperative with exam.        Niko Worthington MD  8/14/2024 3:34 PM

## 2025-04-15 ENCOUNTER — HOSPITAL ENCOUNTER (OUTPATIENT)
Dept: MAMMOGRAPHY | Facility: IMAGING CENTER | Age: 45
Discharge: HOME | End: 2025-04-15
Payer: COMMERCIAL

## 2025-04-15 ENCOUNTER — APPOINTMENT (OUTPATIENT)
Dept: LAB | Facility: HOSPITAL | Age: 45
End: 2025-04-15
Payer: COMMERCIAL

## 2025-04-15 VITALS — WEIGHT: 222 LBS | HEIGHT: 69 IN | BODY MASS INDEX: 32.88 KG/M2

## 2025-04-15 DIAGNOSIS — Z12.31 ENCOUNTER FOR SCREENING MAMMOGRAM FOR MALIGNANT NEOPLASM OF BREAST: ICD-10-CM

## 2025-04-15 DIAGNOSIS — Z00.6 ENCOUNTER FOR EXAMINATION FOR NORMAL COMPARISON OR CONTROL IN CLINICAL RESEARCH PROGRAM: ICD-10-CM

## 2025-04-15 PROCEDURE — 77063 BREAST TOMOSYNTHESIS BI: CPT

## 2025-04-15 PROCEDURE — 77067 SCR MAMMO BI INCL CAD: CPT

## 2025-04-15 PROCEDURE — 36415 COLL VENOUS BLD VENIPUNCTURE: CPT

## 2025-04-24 ENCOUNTER — RESULTS FOLLOW-UP (OUTPATIENT)
Dept: OBGYN CLINIC | Facility: CLINIC | Age: 45
End: 2025-04-24

## 2025-04-28 LAB
APOB+LDLR+PCSK9 GENE MUT ANL BLD/T: NOT DETECTED
BRCA1+BRCA2 DEL+DUP + FULL MUT ANL BLD/T: NOT DETECTED
MLH1+MSH2+MSH6+PMS2 GN DEL+DUP+FUL M: NOT DETECTED

## 2025-05-08 ENCOUNTER — NURSE TRIAGE (OUTPATIENT)
Age: 45
End: 2025-05-08

## 2025-05-08 ENCOUNTER — OFFICE VISIT (OUTPATIENT)
Dept: OBGYN CLINIC | Facility: CLINIC | Age: 45
End: 2025-05-08
Payer: COMMERCIAL

## 2025-05-08 VITALS
SYSTOLIC BLOOD PRESSURE: 102 MMHG | WEIGHT: 232.4 LBS | DIASTOLIC BLOOD PRESSURE: 60 MMHG | BODY MASS INDEX: 34.42 KG/M2 | HEIGHT: 69 IN

## 2025-05-08 DIAGNOSIS — N89.8 VAGINAL DISCHARGE: Primary | ICD-10-CM

## 2025-05-08 DIAGNOSIS — B00.9 HSV INFECTION: ICD-10-CM

## 2025-05-08 LAB
CLUE CELLS SPEC QL WET PREP: NORMAL
PH SMN: 4 [PH]
SL AMB POCT WET MOUNT: NORMAL
T VAGINALIS VAG QL WET PREP: NORMAL

## 2025-05-08 PROCEDURE — 99213 OFFICE O/P EST LOW 20 MIN: CPT | Performed by: PHYSICIAN ASSISTANT

## 2025-05-08 PROCEDURE — 87210 SMEAR WET MOUNT SALINE/INK: CPT | Performed by: PHYSICIAN ASSISTANT

## 2025-05-08 NOTE — TELEPHONE ENCOUNTER
"FOLLOW UP: Appointment scheduled for evaluation today, 5/8. Patient will call back with worsening symptoms.     REASON FOR CONVERSATION: Vaginal Pain    SYMPTOMS: Vaginal pain that started at the end of last week. Also having painful urination. Vaginal pain 4/10 and increases to 8/10 with urination. Today patient having trouble starting to urinate. Patient has been taking Valtrex as she thought this could be HSV outbreak. On pill 4 out of 6. Patient recently took Valtrex course 1 month ago and symptoms improved but began to worsen again about a week ago.     OTHER: Has only had 1 previous HSV outbreak which was initial outbreak.     DISPOSITION: See Today in Office      Reason for Disposition   All other females with painful urination, or patient wants to be seen   MILD-MODERATE vaginal pain and present > 24 hours (Exception: Chronic pain.)    Answer Assessment - Initial Assessment Questions  1. SYMPTOM: \"What's the main symptom you're concerned about?\" (e.g., pain, itching, dryness)      Vaginal pain, pain with urination, difficulty urinating   2. LOCATION: \"Where is the  symptoms located?\" (e.g., inside/outside, left/right)      Vagina   3. ONSET: \"When did the  pain  start?\"      At the end of last week  4. PAIN: \"Is there any pain?\" If Yes, ask: \"How bad is it?\" (Scale: 1-10; mild, moderate, severe)      4/10 when sitting, urinating pain increases to 8/10   5. ITCHING: \"Is there any itching?\" If Yes, ask: \"How bad is it?\" (Scale: 1-10; mild, moderate, severe)      denies  6. CAUSE: \"What do you think is causing the discharge?\" \"Have you had the same problem before?\" \"What happened then?\"      Hsv outbreak, only has had 1 prior  7. OTHER SYMPTOMS: \"Do you have any other symptoms?\" (e.g., fever, itching, vaginal bleeding, pain with urination, injury to genital area, vaginal foreign body)      Pain with urination   8. PREGNANCY: \"Is there any chance you are pregnant?\" \"When was your last menstrual period?\"      Lmp " "4/7/25    Answer Assessment - Initial Assessment Questions  1. SEVERITY: \"How bad is the pain?\"  (e.g., Scale 1-10; mild, moderate, or severe)      8/10 pain with urination  2. FREQUENCY: \"How many times have you had painful urination today?\"       Denies change in frequency  4. ONSET: \"When did the painful urination start?\"       End of last week  7. CAUSE: \"What do you think is causing the painful urination?\"  (e.g., UTI, scratch, Herpes sore)      Possibly from hsv outbreak, unsure    Protocols used: Vaginal Symptoms-Adult-OH, Urination Pain - Female-Adult-[place]    "

## 2025-05-08 NOTE — PROGRESS NOTES
[facility] [address], [address], [place] [address]    ASSESSMENT/PLAN:     1. Vaginal discharge  Assessment & Plan:  Vaginitis panel done today. Office will call with results and appropriate follow up.   Orders:  -     SURESWAB ADVANCED VAGINITIS, TMA  -     POCT wet mount  2. HSV infection  Assessment & Plan:  Reviewed HSV outbreak. Reviewed importance of taking the Valtrex at first sign of outbreak to decrease viral replication and lessen severity of the outbreak.   Reviewed [place] soaks to help with discomfort.   If continues to have recurrent outbreaks consider suppression therapy.       CC:  HSV outbreak, recurrent, feels like may be related to cycle.     HPI: Cira Camargo is a 45 y.o.  who presents for possible HSV outbreak. Reports last month had itching and irritation as well as small bumps. Hx of genital HSV.   Symptoms started back up again about 5 days ago. Took Valtrex starting on Day 3. Reports vulvar sore that is very painful. Burning when urine touches area, no other symptoms of UTI.   Each time happened after menstrual cycle.  Some vaginal discharge, denies odor or itching. No STD concerns.   Denies bowel issues.     ROS: Negative except as noted in HPI    Patient's last menstrual period was 2025.       She  reports being sexually active and has had partner(s) who are male. She reports using the following method of birth control/protection: Female Sterilization.       The following portions of the patient's history were reviewed and updated as appropriate:   Past Medical History:   Diagnosis Date    Asthma     Celiac disease     gluten intolerant    Female infertility     HSV infection     maybe 2 outbreaks a year    Obesity     Papanicolaou smear 2018    Seasonal allergies      Past Surgical History:   Procedure Laterality Date    CERVICAL BIOPSY  W/ LOOP ELECTRODE EXCISION       SECTION  2016     SECTION       CHOLECYSTECTOMY      HERNIA REPAIR      x 2 , 2024    SALPINGECTOMY Bilateral 2019    w/     Family History   Problem Relation Age of Onset    No Known Problems Mother         adopted, unknown family history    Lung cancer Father     Cancer Father     Lung cancer Paternal Grandfather     Cancer Paternal Grandfather     Lung cancer Paternal Aunt     Breast cancer Neg Hx     Colon cancer Neg Hx     Ovarian cancer Neg Hx     Uterine cancer Neg Hx      Social History     Socioeconomic History    Marital status: /Civil Union     Spouse name: Not on file    Number of children: Not on file    Years of education: Not on file    Highest education level: Master's degree (e.g., MA, MS, Roxanne, MEd, MSW, S[employer])   Occupational History    Occupation: Teacher   Tobacco Use    Smoking status: Never    Smokeless tobacco: Never   Vaping Use    Vaping status: Never Used   Substance and Sexual Activity    Alcohol use: Not Currently     Comment: Socially on weekends (less than 4/week)     Drug use: Never    Sexual activity: Yes     Partners: Male     Birth control/protection: Female Sterilization     Comment: no new partners   Other Topics Concern    Not on file   Social History Narrative    Sexual abuse: No    Exercise: Weekly    Domestic violence: No    History of drug/alchol abuse: Denies     Social Drivers of Health     Financial Resource Strain: Not on file   Food Insecurity: Not on file   Transportation Needs: Not on file   Physical Activity: Not on file   Stress: Not on file   Social Connections: Not on file   Intimate Partner Violence: Not on file   Housing Stability: Not on file     Outpatient Medications Marked as Taking for the 25 encounter (Office Visit) with Mireya Melgar PA-C   Medication    albuterol (PROVENTIL HFA,VENTOLIN HFA) 90 mcg/act inhaler    cetirizine (ZyrTEC Allergy) 10 mg tablet    Mometasone Furoate (Asmanex HFA) 100 MCG/ACT AERO     Allergies   Allergen Reactions    Gluten Meal -  "Food Allergy GI Intolerance    Penicillin V Hives           Objective:  /60 (BP Location: Left arm, Patient Position: Sitting, Cuff Size: Standard)   Ht 5' 8.5\" (1.74 m)   Wt 105 kg (232 lb 6.4 oz)   LMP 04/07/2025   BMI 34.82 kg/m²        Chaperone present? Yes: Shira RODRÍGUEZ Student.    Physical Exam  Constitutional:       Appearance: Normal appearance. She is well-developed.   Genitourinary:      Vulva and bladder normal.      No lesions in the vagina.      Right Labia: skin changes.      Right Labia: No rash, tenderness or lesions.     Left Labia: No tenderness, lesions, skin changes or rash.     No labial fusion noted.            No inguinal adenopathy present in the right or left side.     Vaginal discharge (moderate amount of thin and thick white discharge) present.      No vaginal erythema, tenderness or bleeding.        Right Adnexa: not tender, not full and no mass present.     Left Adnexa: not tender, not full and no mass present.     No cervical motion tenderness, discharge or lesion.      Uterus is not enlarged, tender or irregular.      No uterine mass detected.     No urethral prolapse, tenderness or mass present.      Bladder is not tender.    HENT:      Head: Normocephalic and atraumatic.   Neck:      Thyroid: No thyromegaly.   Cardiovascular:      Rate and Rhythm: Normal rate and regular rhythm.      Heart sounds: Normal heart sounds. No murmur heard.     No friction rub. No gallop.   Pulmonary:      Effort: Pulmonary effort is normal. No respiratory distress.      Breath sounds: Normal breath sounds. No wheezing.   Abdominal:      General: There is no distension.      Palpations: Abdomen is soft. There is no mass.      Tenderness: There is no abdominal tenderness. There is no guarding or rebound.      Hernia: No hernia is present.   Lymphadenopathy:      Cervical: No cervical adenopathy.      Upper Body:      Right upper body: No pectoral adenopathy.      Left upper body: No pectoral " adenopathy.      Lower Body: No right inguinal adenopathy. No left inguinal adenopathy.   Neurological:      Mental Status: She is alert and oriented to person, place, and time.   Skin:     General: Skin is warm and dry.   Psychiatric:         Behavior: Behavior normal.           Results for orders placed or performed in visit on 05/08/25   POCT wet mount   Result Value Ref Range    WET MOUNT neg     pH 4     Clue Cells neg     Trich, Wet Prep neg          Mireya RANDY Melgar  5/8/2025 9:27 PM

## 2025-05-09 LAB
BV BACTERIA RRNA VAG QL NAA+PROBE: NEGATIVE
C GLABRATA RNA VAG QL NAA+PROBE: NOT DETECTED
CANDIDA RRNA VAG QL PROBE: NOT DETECTED
T VAGINALIS RRNA SPEC QL NAA+PROBE: NOT DETECTED

## 2025-05-09 NOTE — ASSESSMENT & PLAN NOTE
Reviewed HSV outbreak. Reviewed importance of taking the Valtrex at first sign of outbreak to decrease viral replication and lessen severity of the outbreak.   Reviewed [place] soaks to help with discomfort.   If continues to have recurrent outbreaks consider suppression therapy.

## 2025-05-13 ENCOUNTER — RESULTS FOLLOW-UP (OUTPATIENT)
Dept: OBGYN CLINIC | Facility: CLINIC | Age: 45
End: 2025-05-13

## 2025-05-23 ENCOUNTER — HOSPITAL ENCOUNTER (OUTPATIENT)
Dept: ULTRASOUND IMAGING | Facility: CLINIC | Age: 45
Discharge: HOME | End: 2025-05-23
Attending: STUDENT IN AN ORGANIZED HEALTH CARE EDUCATION/TRAINING PROGRAM
Payer: COMMERCIAL

## 2025-05-23 ENCOUNTER — HOSPITAL ENCOUNTER (OUTPATIENT)
Dept: MAMMOGRAPHY | Facility: CLINIC | Age: 45
Discharge: HOME | End: 2025-05-23
Attending: STUDENT IN AN ORGANIZED HEALTH CARE EDUCATION/TRAINING PROGRAM
Payer: COMMERCIAL

## 2025-05-23 DIAGNOSIS — R92.8 ABNORMAL SCREENING MAMMOGRAM: ICD-10-CM

## 2025-05-23 DIAGNOSIS — R92.8 ABNORMAL MAMMOGRAM: ICD-10-CM

## 2025-05-23 PROCEDURE — 76642 ULTRASOUND BREAST LIMITED: CPT

## 2025-05-23 PROCEDURE — 77066 DX MAMMO INCL CAD BI: CPT

## 2025-05-27 ENCOUNTER — RESULTS FOLLOW-UP (OUTPATIENT)
Dept: LABOR AND DELIVERY | Facility: HOSPITAL | Age: 45
End: 2025-05-27

## 2025-07-30 ENCOUNTER — OFFICE VISIT (OUTPATIENT)
Age: 45
End: 2025-07-30
Payer: COMMERCIAL

## 2025-07-30 VITALS — WEIGHT: 232 LBS | HEIGHT: 69 IN | BODY MASS INDEX: 34.36 KG/M2

## 2025-07-30 DIAGNOSIS — S92.492A OTHER FRACTURE OF LEFT GREAT TOE, INITIAL ENCOUNTER FOR CLOSED FRACTURE: Primary | ICD-10-CM

## 2025-07-30 PROCEDURE — 28490 TREAT BIG TOE FRACTURE: CPT | Performed by: FAMILY MEDICINE

## 2025-07-30 PROCEDURE — 99204 OFFICE O/P NEW MOD 45 MIN: CPT | Performed by: FAMILY MEDICINE

## 2025-07-30 RX ORDER — ERGOCALCIFEROL 1.25 MG/1
50000 CAPSULE, LIQUID FILLED ORAL WEEKLY
COMMUNITY
Start: 2025-07-18

## 2025-08-20 ENCOUNTER — ANNUAL EXAM (OUTPATIENT)
Dept: OBGYN CLINIC | Facility: CLINIC | Age: 45
End: 2025-08-20
Payer: COMMERCIAL

## 2025-08-20 VITALS
SYSTOLIC BLOOD PRESSURE: 96 MMHG | DIASTOLIC BLOOD PRESSURE: 60 MMHG | HEIGHT: 69 IN | WEIGHT: 226 LBS | BODY MASS INDEX: 33.47 KG/M2

## 2025-08-20 DIAGNOSIS — Z12.31 ENCOUNTER FOR SCREENING MAMMOGRAM FOR MALIGNANT NEOPLASM OF BREAST: ICD-10-CM

## 2025-08-20 DIAGNOSIS — Z01.419 ENCOUNTER FOR ANNUAL ROUTINE GYNECOLOGICAL EXAMINATION: Primary | ICD-10-CM

## 2025-08-20 DIAGNOSIS — Z12.11 SCREEN FOR COLON CANCER: ICD-10-CM

## 2025-08-20 DIAGNOSIS — Z12.4 SCREENING FOR CERVICAL CANCER: ICD-10-CM

## 2025-08-20 PROCEDURE — S0612 ANNUAL GYNECOLOGICAL EXAMINA: HCPCS | Performed by: STUDENT IN AN ORGANIZED HEALTH CARE EDUCATION/TRAINING PROGRAM

## 2025-08-22 LAB
CYTOLOGIST CVX/VAG CYTO: NORMAL
DX ICD CODE: NORMAL
HPV GENOTYPE REFLEX: NORMAL
HPV I/H RISK 4 DNA CVX QL PROBE+SIG AMP: NEGATIVE
OTHER STN SPEC: NORMAL
PATH REPORT.FINAL DX SPEC: NORMAL
SL AMB NOTE:: NORMAL
SL AMB SPECIMEN ADEQUACY: NORMAL
SL AMB TEST METHODOLOGY: NORMAL